# Patient Record
(demographics unavailable — no encounter records)

---

## 2018-07-19 NOTE — XRAY REPORT
LUMBOSACRAL SPINE, 3 VIEWS:



History: Back injury



Findings: The vertebral bodies, disk spaces and posterior elements are 

intact.  No compression deformity or malalignment.  The SI joints are 

symmetric and unremarkable.



Impression:

1.  No evidence for acute injury to the lumbar spine.

## 2018-07-19 NOTE — EMERGENCY DEPARTMENT REPORT
ED Back Pain/Injury HPI





- General


Chief Complaint: Back Pain/Injury


Stated Complaint: JUANIS KHANNA


Time Seen by Provider: 18 13:16


Source: patient


Limitations: No Limitations





- History of Present Illness


Initial Comments: 





Patient is 51 years old male history of coronary artery disease and 

hypertension.  Patient presented to the ER complaining of lower back pain that 

radiated down to his leg.  Patient stated that pain started all of a sudden 

when he bend it to grab a piece of paper.  Patient denied any weakness, 

numbness or tingling sensation in his lower extremities.  No bowel or bladder 

incontinence.  Patient denied any fever recently.


MD Complaint: back pain


Similar Symptoms Previously: No


Place: home


Quality: sharp


Improves With: immobilization


Worsens With: movement


Context: bending


Associated Symptoms: denies other symptoms.  denies: confusion, weakness, chest 

pain, numbness, difficulty walking, cough, difficulty urinating, diaphoresis, 

incontinence, fever/chills, constipation, headaches





- Related Data


 Allergies











Allergy/AdvReac Type Severity Reaction Status Date / Time


 


No Known Allergies Allergy   Verified 18 11:41














ED Review of Systems


ROS: 


Stated complaint: JUANIS KHANNA


Other details as noted in HPI





Comment: All other systems reviewed and negative


Constitutional: denies: chills, fever


Cardiovascular: denies: chest pain


Gastrointestinal: denies: abdominal pain, nausea


Musculoskeletal: back pain.  denies: joint swelling, arthralgia, myalgia


Neurological: denies: headache, weakness, numbness, paresthesias





ED Past Medical Hx





- Past Medical History


Previous Medical History?: Yes


Hx Hypertension: Yes





- Surgical History


Past Surgical History?: Yes


Additional Surgical History: stent placement





- Social History


Smoking Status: Current Every Day Smoker


Substance Use Type: None





ED Physical Exam





- General


Limitations: No Limitations


General appearance: alert, in no apparent distress





- Head


Head exam: Present: atraumatic, normocephalic, normal inspection





- ENT


ENT exam: Present: normal exam





- Neck


Neck exam: Present: normal inspection, full ROM.  Absent: tenderness, 

lymphadenopathy, thyromegaly





- Respiratory


Respiratory exam: Present: normal lung sounds bilaterally.  Absent: respiratory 

distress, wheezes, rales, rhonchi, accessory muscle use, decreased breath sounds

, prolonged expiratory





- Cardiovascular


Cardiovascular Exam: Present: regular rate, normal rhythm, normal heart sounds





- GI/Abdominal


GI/Abdominal exam: Present: soft, normal bowel sounds.  Absent: distended, 

tenderness, guarding, rebound, rigid, organomegaly, mass, bruit, pulsatile mass





- Extremities Exam


Extremities exam: Present: normal inspection, full ROM, normal capillary 

refill.  Absent: tenderness, pedal edema, joint swelling, calf tenderness





- Back Exam


Back exam: Present: normal inspection, full ROM, muscle spasm.  Absent: 

tenderness, CVA tenderness (R), CVA tenderness (L), paraspinal tenderness, 

vertebral tenderness, rash noted





- Neurological Exam


Neurological exam: Present: alert, oriented X3, CN II-XII intact, normal gait, 

reflexes normal





- Skin


Skin exam: Present: warm, intact, normal color





ED Course


 Vital Signs











  18





  11:41 13:55


 


Temperature 97.8 F 


 


Pulse Rate 67 


 


Respiratory 16 18





Rate  


 


Blood Pressure 153/90 


 


O2 Sat by Pulse 96 





Oximetry  














ED Medical Decision Making





- Radiology Data


Radiology results: report reviewed





Referring Physician:   ANUJ RAMIREZ


Patient Name:   JUANIS KHANNA


Patient ID:   [id number]


YOB: 1966


Sex:   Male


Accession:   [id number]


Report Date:   2018


Report Status:   Finalized


Findings


[facility] 


[address] [place] [address] 





XRay Report 


Signed 





Patient: JUANIS KHANNA MR#: [id number] 


: 1966 Acct:[id number] 


Age/Sex: 51 / M ADM Date: 18 


Loc: ED 


Attending Dr: 








Ordering Physician: ANUJ RAMIREZ 


Date of Service: 18 


Procedure(s): XR spine lumbosacral 2-3V 


Accession Number(s): [id number] 





cc: ANUJ RAMIREZ 





Fluoro Time In Minutes: 





LUMBOSACRAL SPINE, 3 VIEWS: 





History: Back injury 





Findings: The vertebral bodies, disk spaces and posterior elements are 


intact. No compression deformity or malalignment. The SI joints are 


symmetric and unremarkable. 





Impression: 


1. No evidence for acute injury to the lumbar spine. 





Transcribed By: TTR 


Dictated By: JUANIS WILLIS JR, MD 


Electronically Authenticated By: JUANIS WILLIS JR, MD 


Signed Date/Time: 18 1404 











DD/DT: 184 


TD/TT: 18 1404


Critical care attestation.: 


If time is entered above; I have spent that time in minutes in the direct care 

of this critically ill patient, excluding procedure time.








ED Disposition


Clinical Impression: 


 Back pain, Sciatica





Disposition:  TO HOME OR SELFCARE


Is pt being admited?: No


Condition: Stable


Instructions:  Lumbar Radiculopathy (ED)


Referrals: 


PRIMARY CARE,MD [Primary Care Provider] - 3-5 Days